# Patient Record
(demographics unavailable — no encounter records)

---

## 2025-03-19 NOTE — PHYSICAL EXAM
[TextEntry] : PHYSICAL EXAM  General: The patient was alert, oriented and in no distress. Voice was clear.  Face: The patient had no facial asymmetry or mass. The skin was unremarkable.  Ears: Hearing normal to conversational voice External ears were normal without deformity. Ear canals were clear. No cerumen or inflammation Tympanic membranes were intact and normal. No perforation or effusion. mobile  Nose:  The external nose had no significant deformity.. On anterior rhinoscopy, the nasal mucosa was clear.  The anterior septum was grossly midline. There were no visualized polyps, purulence  or masses.  Dasha maneuver is negative for nasal valve collapse  Oral cavity: Oral mucosa- normal. Oral and base of tongue- clear and without mass. Gingival and buccal mucosa- moist and without lesions. Palate- the palate moved well. There was no cleft palate. There appeared to be good salivary flow.   Oral cavity/oropharynx- no pus, erythema or mass 2+ tonsils Type IV Fam tongue position  Neck:  The neck was symmetrical. The parotid and submandibular glands were normal without masses. The trachea was midline and there was no unusual crepitus. Thyroid was smooth and nontender and no masses were palpated. No masses  Lymphatics: Cervical adenopathy- none.    PROCEDURE:  FLEXIBLE LARYNGOSCOPY, NASAL ENDOSCOPY   Surgeon: Dr. Morataya Indication: Chronic rhinitis, assess for deviated septum, sleep apnea, inadequate exam on anterior rhinoscopy Anesthetic: Topical lidocaine and Afrin Procedure: The patient was placed in a sitting position.  Following application of the topical anesthetic and decongestant, exam was performed with a flexible telescope.  The scope was passed along the right nasal floor to the nasopharynx.  It was then passed into the region of the middle meatus, middle turbinate, and sphenoethmoid region.  An identical procedure was performed on the left side.  The following findings were noted:  Nasal mucosa: Edematous Septum: Deviated bilaterally with narrow nasal passages  Right nasal cavity      Inferior turbinate: Hypertrophic      Middle turbinate: normal      Superior turbinate: normal      Inferior meatus: no pus, polyps or congestion      Middle meatus:  no pus, polyps or congestion       Superior meatus:  no pus, polyps, or congestion      Sphenoethmoidal recess: no pus, polyps or congestion   Left nasal cavity      Inferior turbinate: Hypertrophic      Middle turbinate: normal      Superior turbinate: normal      Inferior meatus: no pus, polyps or congestion      Middle meatus: no pus, polyps, or congestion      Superior meatus:  no pus, polyps, or congestion      Sphenoethmoidal recess: no pus, polyps or congestion   Nasopharynx: no masses, choanae patent, no adenoid tissue  Base of tongue and vallecula: no masses or asymmetry Posterior pharyngeal wall: no masses.  Hypopharynx: symmetrical. No masses Pyriform sinuses: no lesions or pooling of secretions Epiglottis: normal. No edema or lesions Aryepiglottic folds: normal. No lesions.  True vocal cords: clear and mobile. No lesions. Airway patent False vocal cords: normal Ventricles: no masses.  Arytenoids: Normal Interarytenoid area: no masses.  Mild edema Subglottis: normal. no masses

## 2025-03-19 NOTE — HISTORY OF PRESENT ILLNESS
[de-identified] : ALFREDO GOINS is a 44 year old patient With a lifelong history of nasal obstruction.  It is bilateral and constant.  It is worse when the weather is dry.  He occasionally has bleeding.  He was diagnosed in the past with sleep apnea.  He was given CPAP but has difficulty using it.  He did say that he has lost 30 pounds.  He has a history of allergies.  He had testing as a child No immunotherapy No recurrent sinus infections No history of nasal trauma or nasal/sinus surgery No pets at home No known history of reflux He does not smoke or vape tobacco or marijuana He has tried nasal saline rinses and Flonase/nasal steroid spray in the past.  The nasal saline rinses temporarily helped.  He is not sure if the nasal steroid spray helped He has not tried antihistamines He has not tried Breathe Right strips

## 2025-03-19 NOTE — ASSESSMENT
[FreeTextEntry1] : He has a long history of bilateral nasal congestion and obstruction.  He does suffer from allergies.  On exam, he has a deviated septum, inferior turbinate hypertrophy, nasal mucosal edema, and narrow nasal passages.  He also had laryngeal changes suggestive of reflux.  He has a history of sleep apnea.  He has CPAP but has difficulty using it  Plan -Findings and management options were discussed with the patient. - I recommended another trial of nasal saline irrigations and nasal steroid spray - Repeat allergy evaluation recommended - Reflux precautions recommended.  He was given literature - Moisturizing nasal gel as needed for nasal dryness and bleeding - He may consider septoplasty and inferior turbinate reduction to improve the nasal obstruction.  It may make it easier for him to use the CPAP - I have recommended evaluation at the sleep center for sleep apnea.  We will see if they recommend repeat sleep study since he has lost weight.  He may consider other treatment options for sleep apnea including an oral appliance, various surgical procedures, and/or the neurostimulator - Follow-up in approximately 3 weeks - Call and return earlier if any problems